# Patient Record
(demographics unavailable — no encounter records)

---

## 2024-10-31 NOTE — HISTORY OF PRESENT ILLNESS
[Needs Immunizations] : Needs immunizations [Eats meals with family] : eats meals with family [Has family members/adults to turn to for help] : has family members/adults to turn to for help [Is permitted and is able to make independent decisions] : Is permitted and is able to make independent decisions [Sleep Concerns] : sleep concerns [Grade: ____] : Grade: [unfilled] [Normal Performance] : normal performance [Eats regular meals including adequate fruits and vegetables] : eats regular meals including adequate fruits and vegetables [Drinks non-sweetened liquids] : drinks non-sweetened liquids  [Calcium source] : calcium source [Has friends] : has friends [At least 1 hour of physical activity a day] : at least 1 hour of physical activity a day [Has interests/participates in community activities/volunteers] : has interests/participates in community activities/volunteers. [Uses safety belts/safety equipment] : uses safety belts/safety equipment  [Has peer relationships free of violence] : has peer relationships free of violence [No] : Patient has not had sexual intercourse [Has ways to cope with stress] : has ways to cope with stress [Displays self-confidence] : displays self-confidence [Has problems with sleep] : has problems with sleep [With Teen] : teen [NO] : No [Has concerns about body or appearance] : does not have concerns about body or appearance [Screen time (except homework) less than 2 hours a day] : no screen time (except homework) less than 2 hours a day [Uses electronic nicotine delivery system] : does not use electronic nicotine delivery system [Uses tobacco] : does not use tobacco [Uses drugs] : does not use drugs  [Drinks alcohol] : does not drink alcohol [Impaired/distracted driving] : no impaired/distracted driving [Gets depressed, anxious, or irritable/has mood swings] : does not get depressed, anxious, or irritable/has mood swings [Has thought about hurting self or considered suicide] : has not thought about hurting self or considered suicide [FreeTextEntry7] : No recent illnesses, hospitalizations, or surgeries; No contact with COVID-19 positive individuals/PUI.  [de-identified] : Denies any medical concerns at present [de-identified] : Last dental visit: Never; Brushes teeth 2x per day; [de-identified] : Due for Influenza and COVID-19 [de-identified] : Lives with dad, stepmother, 2 stepsisters [de-identified] : [employer] [de-identified] : Enjoys playing video games and sports [de-identified] : Identifies as male; Interested in females only; Not currently in a relationship. Never had sex. [FreeTextEntry1] : 17 year old male presents to clinic for CPE and working clearance papers. 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

## 2024-10-31 NOTE — RISK ASSESSMENT

## 2024-10-31 NOTE — PHYSICAL EXAM
[Alert] : alert [No Acute Distress] : no acute distress [Normocephalic] : normocephalic [Atraumatic] : atraumatic [EOMI Bilateral] : EOMI bilateral [PERRLA] : ELSA [Conjunctivae with no discharge] : conjunctivae with no discharge [No Excess Tearing] : no excess tearing [Clear tympanic membranes with bony landmarks and light reflex present bilaterally] : clear tympanic membranes with bony landmarks and light reflex present bilaterally  [Auditory Canals Clear] : auditory canals clear [Pink Nasal Mucosa] : pink nasal mucosa [Nares Patent] : nares patent [No Discharge] : no discharge [Nonerythematous Oropharynx] : nonerythematous oropharynx [No Caries] : no caries [Palate Intact] : palate intact [Uvula Midline] : uvula midline [Supple, full passive range of motion] : supple, full passive range of motion [No Palpable Masses] : no palpable masses [Trachea Midline] : trachea midline [Clear to Auscultation Bilaterally] : clear to auscultation bilaterally [Regular Rate and Rhythm] : regular rate and rhythm [Normal S1, S2 audible] : normal S1, S2 audible [No Murmurs] : no murmurs [Soft] : soft [NonTender] : non tender [Non Distended] : non distended [Normoactive Bowel Sounds] : normoactive bowel sounds [No Hepatomegaly] : no hepatomegaly [No Splenomegaly] : no splenomegaly [Maxim: _____] : Maxim [unfilled] [Uncircumcised] : uncircumcised [Bilateral descended testes] : bilateral descended testes [No Testicular Masses] : no testicular masses [No Abnormal Lymph Nodes Palpated] : no abnormal lymph nodes palpated [Normal Muscle Tone] : normal muscle tone [No Gait Asymmetry] : no gait asymmetry [No pain or deformities with palpation of bone, muscles, joints] : no pain or deformities with palpation of bone, muscles, joints [Straight] : straight [+2 Patella DTR] : +2 patella DTR [Cranial Nerves Grossly Intact] : cranial nerves grossly intact [No Rash or Lesions] : no rash or lesions [Moves all extremities x 4] : moves all extremities x4 [No Scoliosis] : no scoliosis [Symmetric Chest Rise] : symmetric chest rise [FreeTextEntry8] : +2 pedal pulses bilaterally [de-identified] : deferred [de-identified] : normal gait for age, good posture, normal clinical alignment in upper and lower extremities; heel-toe walk intact; duck walk intact; hops on one foot bilaterally without difficulty

## 2024-10-31 NOTE — DISCUSSION/SUMMARY
[Normal Development] : development  [No Elimination Concerns] : elimination [Continue Regimen] : feeding [No Skin Concerns] : skin [Normal Sleep Pattern] : sleep [BMI ___] : body mass index of [unfilled] [None] : no medical problems [Anticipatory Guidance Given] : Anticipatory guidance addressed as per the history of present illness section [Physical Growth and Development] : physical growth and development [Social and Academic Competence] : social and academic competence [Emotional Well-Being] : emotional well-being [Risk Reduction] : risk reduction [Violence and Injury Prevention] : violence and injury prevention [Patient] : patient [Full Activity without restrictions including Physical Education & Athletics] : Full Activity without restrictions including Physical Education & Athletics [I have examined the above-named student and completed the preparticipation physical evaluation. The athlete does not present apparent clinical contraindications to practice and participate in sport(s) as outlined above. A copy of the physical exam is on r] : I have examined the above-named student and completed the preparticipation physical evaluation. The athlete does not present apparent clinical contraindications to practice and participate in sport(s) as outlined above. A copy of the physical exam is on record in my office and can be made available to the school at the request of the parents. If conditions arise after the athlete has been cleared for participation, the physician may rescind the clearance until the problem is resolved and the potential consequences are completely explained to the athlete (and parents/guardians). [FreeTextEntry1] : 17 year old male presents to clinic for CPE and work clearance paperwork. 1. Immunizations Pt is due for _____Influenza and COVID-19____________ vaccines. Provided patient with VIS and vaccine consent for parent to review, sign, and return for administration.  2. Need for dental care -Pt has never been seen by dentist -Needs routine preventative dental care -Provided list of local dental clinics, make next available appointment  3. Obesity -Laboratory evaluation today screening for NAFLD, dyslipidemia, and diabetes: ALT, lipid profile, HA1c.  -Will contact patient for any abnormal results.  4.  -[facility] performed and reviewed with patient. No positive indicators noted. Anticipatory guidance provided.  5. Paperwork -Health report card and CIR sent home for parent to review -Work clearance paperwork provided to patient.  RTC in 1 year for CPE or sooner as needed.